# Patient Record
(demographics unavailable — no encounter records)

---

## 2025-07-14 NOTE — PHYSICAL EXAM
[Declined] : declined [FreeTextEntry3] : edematous pink plaque with pseudovesiculation L forearm; smaller pink edematous papules scattered on arms and torso

## 2025-07-14 NOTE — ASSESSMENT
[FreeTextEntry1] : #Allergic contact dermatitis 2/2 poison ivy - acute flare - Diagnosis and treatment options discussed, including course of condition, expectations, and goals of therapy.  - Start betamethasone diprop augmented 0.05% ointment BID to AA for 2-3 weeks.  Strong topical steroids should generally not be used on the face, in armpits, or in other skin folds, unless specifically directed otherwise.  Overuse of steroids can lead to thinning of the skin, appearance of red blood vessels, or discoloration of the skin. - Start oral prednisone pills in a tapering fashion to last a total of 12 days.  Take 60 mg (3 pills) each morning for 4 days, then 40 mg (2 pills) each morning for 4 days, then 20 mg (1 pill) each morning for 4 days, then STOP. - Side effects of oral prednisone: anxiety (so suggest taking in the morning so it does not keep you awake at night), increased blood pressure, increased blood sugar (diabetes), weight gain (suggest continuing to eat the amount you normally do, and no more even if you are still hungry after a meal), avascular necrosis, osteoporosis, glaucoma, cataract, infections and immunosuppression. Long-term prednisone (longer than 1 month's duration) can cause thinning of the bones. While on prednisone orally, suggest taking vitamin D and calcium supplementation (typically both vitamins come together in the same pill - calcium 600 mg + vitamin D).

## 2025-07-14 NOTE — HISTORY OF PRESENT ILLNESS
[FreeTextEntry1] : RPV -  rash [de-identified] : ALDO VARGAS is a 65 year old F who present for f/u as below. LV sept 2024 for fbse   #Rash started 1 week ago on L forearm, now spreading. Used anti-itch patch for 2 days, which made the rash worse on L forearm, more inflamed  Personal hx of skin cancer: no FHx of skin cancer: no Social hx:  for [facility]